# Patient Record
Sex: MALE | Race: WHITE | NOT HISPANIC OR LATINO | Employment: FULL TIME | URBAN - METROPOLITAN AREA
[De-identification: names, ages, dates, MRNs, and addresses within clinical notes are randomized per-mention and may not be internally consistent; named-entity substitution may affect disease eponyms.]

---
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
## 2022-11-03 ENCOUNTER — HOSPITAL ENCOUNTER (OUTPATIENT)
Dept: LAB | Facility: MEDICAL CENTER | Age: 22
End: 2022-11-03
Attending: PHYSICIAN ASSISTANT
Payer: COMMERCIAL

## 2022-11-03 LAB
ALBUMIN SERPL BCP-MCNC: 4.8 G/DL (ref 3.2–4.9)
ALBUMIN/GLOB SERPL: 1.7 G/DL
ALP SERPL-CCNC: 111 U/L (ref 30–99)
ALT SERPL-CCNC: 16 U/L (ref 2–50)
ANION GAP SERPL CALC-SCNC: 9 MMOL/L (ref 7–16)
AST SERPL-CCNC: 17 U/L (ref 12–45)
BASOPHILS # BLD AUTO: 0.6 % (ref 0–1.8)
BASOPHILS # BLD: 0.04 K/UL (ref 0–0.12)
BILIRUB SERPL-MCNC: 0.7 MG/DL (ref 0.1–1.5)
BUN SERPL-MCNC: 12 MG/DL (ref 8–22)
CALCIUM SERPL-MCNC: 9.6 MG/DL (ref 8.5–10.5)
CHLORIDE SERPL-SCNC: 103 MMOL/L (ref 96–112)
CO2 SERPL-SCNC: 26 MMOL/L (ref 20–33)
CREAT SERPL-MCNC: 1.05 MG/DL (ref 0.5–1.4)
EOSINOPHIL # BLD AUTO: 0.26 K/UL (ref 0–0.51)
EOSINOPHIL NFR BLD: 3.8 % (ref 0–6.9)
ERYTHROCYTE [DISTWIDTH] IN BLOOD BY AUTOMATED COUNT: 40.6 FL (ref 35.9–50)
GFR SERPLBLD CREATININE-BSD FMLA CKD-EPI: 104 ML/MIN/1.73 M 2
GLOBULIN SER CALC-MCNC: 2.8 G/DL (ref 1.9–3.5)
GLUCOSE SERPL-MCNC: 98 MG/DL (ref 65–99)
HCT VFR BLD AUTO: 47.4 % (ref 42–52)
HGB BLD-MCNC: 16.4 G/DL (ref 14–18)
IMM GRANULOCYTES # BLD AUTO: 0.02 K/UL (ref 0–0.11)
IMM GRANULOCYTES NFR BLD AUTO: 0.3 % (ref 0–0.9)
LYMPHOCYTES # BLD AUTO: 2.65 K/UL (ref 1–4.8)
LYMPHOCYTES NFR BLD: 39.1 % (ref 22–41)
MCH RBC QN AUTO: 30.4 PG (ref 27–33)
MCHC RBC AUTO-ENTMCNC: 34.6 G/DL (ref 33.7–35.3)
MCV RBC AUTO: 87.9 FL (ref 81.4–97.8)
MONOCYTES # BLD AUTO: 0.57 K/UL (ref 0–0.85)
MONOCYTES NFR BLD AUTO: 8.4 % (ref 0–13.4)
NEUTROPHILS # BLD AUTO: 3.24 K/UL (ref 1.82–7.42)
NEUTROPHILS NFR BLD: 47.8 % (ref 44–72)
NRBC # BLD AUTO: 0 K/UL
NRBC BLD-RTO: 0 /100 WBC
PLATELET # BLD AUTO: 266 K/UL (ref 164–446)
PMV BLD AUTO: 10.1 FL (ref 9–12.9)
POTASSIUM SERPL-SCNC: 4.6 MMOL/L (ref 3.6–5.5)
PROT SERPL-MCNC: 7.6 G/DL (ref 6–8.2)
RBC # BLD AUTO: 5.39 M/UL (ref 4.7–6.1)
SODIUM SERPL-SCNC: 138 MMOL/L (ref 135–145)
WBC # BLD AUTO: 6.8 K/UL (ref 4.8–10.8)

## 2022-11-03 PROCEDURE — 80053 COMPREHEN METABOLIC PANEL: CPT

## 2022-11-03 PROCEDURE — 36415 COLL VENOUS BLD VENIPUNCTURE: CPT

## 2022-11-03 PROCEDURE — 85025 COMPLETE CBC W/AUTO DIFF WBC: CPT

## 2022-11-11 ENCOUNTER — HOSPITAL ENCOUNTER (OUTPATIENT)
Dept: RADIOLOGY | Facility: MEDICAL CENTER | Age: 22
End: 2022-11-11
Attending: PHYSICIAN ASSISTANT
Payer: COMMERCIAL

## 2022-11-11 DIAGNOSIS — I86.1 SCROTAL VARICES: ICD-10-CM

## 2022-11-11 PROCEDURE — 700117 HCHG RX CONTRAST REV CODE 255: Performed by: PHYSICIAN ASSISTANT

## 2022-11-11 PROCEDURE — 72193 CT PELVIS W/DYE: CPT

## 2022-11-11 RX ADMIN — IOHEXOL 100 ML: 350 INJECTION, SOLUTION INTRAVENOUS at 10:58

## 2022-11-18 NOTE — PROGRESS NOTES
Interventional Radiology Consultation      Re: René Jameson     MRN: [id number]   : 2000    René Jameson was referred by Bruce Greene PA-C. He is a 22 y.o. male seen in clinic for evaluation and possible intervention of a painful varicocele. He does not have a local PCP.    History of Present Illness:   presents with a painful left varicocele for a couple of years. He reports a lump in his left testicle that has not changed in size. He had a scrotal USD in [place] in , and results are not available but the provided clinical notes indicate it was normal. Unfortunately his insurance did not approve the imaging to complete the workup and there is incomplete imaging of the gonadal.  has been referred to interventional radiology for evaluation and management with coil embolization. Today, he reports pain all day from the left testicle radiating to his low back at times. It is both sharp and aching. It does not change with working out but slouching posture can make it worse. He has not had surgery for the varicocele. He lives in [place] and works with no heavy lifting duties. He is unaccompanied by his support person to today's consultation.     No past medical history on file.  No past surgical history on file.  Social History     Socioeconomic History    Marital status: Single     Spouse name: Not on file    Number of children: Not on file    Years of education: Not on file    Highest education level: Not on file   Occupational History    Not on file   Tobacco Use    Smoking status: Never    Smokeless tobacco: Never   Substance and Sexual Activity    Alcohol use: Not on file    Drug use: Not on file    Sexual activity: Not on file   Other Topics Concern    Not on file   Social History Narrative    Not on file     Social Determinants of Health     Financial Resource Strain: Not on file   Food Insecurity: Not on file   Transportation Needs: Not on file   Physical Activity: Not on file    Stress: Not on file   Social Connections: Not on file   Intimate Partner Violence: Not on file   Housing Stability: Not on file     No family history on file.    Review of Systems   Constitutional: Negative.  Negative for chills, diaphoresis, fever, malaise/fatigue and weight loss.   Genitourinary:         Positive for left testicle pain   Musculoskeletal:  Positive for back pain.   Psychiatric/Behavioral:  Negative for substance abuse.      A comprehensive 14-point review of systems was negative except as described above.     Labs:   None available for review      Pathology:  none    Radiology:   CT pelvis on 11/11/22 at [facility]:  Unremarkable CT pelvis.    USD scrotum 6/11/21, report only for [place]:      Current Outpatient Medications   Medication Sig Dispense Refill    benzonatate (TESSALON) 100 MG Cap Take 1-2 caps PO TID prn cough 40 Capsule 0     No current facility-administered medications for this visit.       Allergies   Allergen Reactions    Biaxin [Clarithromycin]        Physical Exam  Constitutional:       General: He is not in acute distress.     Appearance: He is not diaphoretic.   HENT:      Head: Normocephalic.   Eyes:      General: No scleral icterus.  Pulmonary:      Effort: Pulmonary effort is normal. No respiratory distress.   Abdominal:      General: There is no distension.   Skin:     General: Skin is warm and dry.      Coloration: Skin is not pale.      Findings: No erythema or rash.   Neurological:      General: No focal deficit present.      Mental Status: He is alert and oriented to person, place, and time. He is not disoriented.      Cranial Nerves: No cranial nerve deficit or facial asymmetry.      Sensory: Sensation is intact.      Motor: No weakness or tremor.      Coordination: Coordination normal.      Gait: Gait is intact. Gait normal.   Psychiatric:         Mood and Affect: Mood and affect normal.         Behavior: Behavior normal.         Thought Content: Thought content  normal.         Cognition and Memory: Memory normal.         Judgment: Judgment normal.     Impression:   1. Painful left varicocele.    Plan:   Marcus Rivera MD has reviewed 's history and imaging studies, examined the patient, and discussed treatment options.  has a reported painful left varicocele. We have no diagnostic criteria so we have ordered an ultrasound to evaluate the complaint. If he meets diagnostic criteria, he would be a candidate for embolization. We discussed the method of the procedure at length including angiography and embolization as well as the expected clinical course with the possibility of post embolization discomfort and lifting restrictions of 3 weeks. We discussed the possibility of varicocele recurrence. We will discuss the procedure risks if he meets diagnostic criteria and wishes to schedule the procedure. We will contact the patient once the ultrasound results are available.    JODY Mcfarland with Marcus Rivera MD  Interventional Radiology   [address] ([address])  [place] [address]  [phone]

## 2022-11-22 ENCOUNTER — HOSPITAL ENCOUNTER (OUTPATIENT)
Dept: RADIOLOGY | Facility: MEDICAL CENTER | Age: 22
End: 2022-11-22
Attending: PHYSICIAN ASSISTANT
Payer: COMMERCIAL

## 2022-11-22 DIAGNOSIS — N50.819 PAIN IN TESTICLE, UNSPECIFIED LATERALITY: ICD-10-CM

## 2022-11-22 DIAGNOSIS — I86.1 VARICOCELE: ICD-10-CM

## 2022-11-22 ASSESSMENT — LIFESTYLE VARIABLES: SUBSTANCE_ABUSE: 0

## 2022-11-22 ASSESSMENT — ENCOUNTER SYMPTOMS
CONSTITUTIONAL NEGATIVE: 1
FEVER: 0
WEIGHT LOSS: 0
DIAPHORESIS: 0
BACK PAIN: 1
CHILLS: 0

## 2023-07-08 ENCOUNTER — OFFICE VISIT (OUTPATIENT)
Dept: URGENT CARE | Facility: CLINIC | Age: 23
End: 2023-07-08
Payer: COMMERCIAL

## 2023-07-08 VITALS
OXYGEN SATURATION: 96 % | HEART RATE: 100 BPM | WEIGHT: 164.6 LBS | TEMPERATURE: 97.7 F | SYSTOLIC BLOOD PRESSURE: 110 MMHG | BODY MASS INDEX: 20.47 KG/M2 | RESPIRATION RATE: 16 BRPM | DIASTOLIC BLOOD PRESSURE: 62 MMHG | HEIGHT: 75 IN

## 2023-07-08 DIAGNOSIS — H16.9: ICD-10-CM

## 2023-07-08 DIAGNOSIS — H18.822 CONTACT LENS OVERWEAR OF LEFT EYE: ICD-10-CM

## 2023-07-08 PROCEDURE — 3074F SYST BP LT 130 MM HG: CPT

## 2023-07-08 PROCEDURE — 99213 OFFICE O/P EST LOW 20 MIN: CPT

## 2023-07-08 PROCEDURE — 3078F DIAST BP <80 MM HG: CPT

## 2023-07-08 RX ORDER — OFLOXACIN 3 MG/ML
1 SOLUTION/ DROPS OPHTHALMIC 4 TIMES DAILY
Qty: 5 ML | Refills: 0 | Status: SHIPPED | OUTPATIENT
Start: 2023-07-08 | End: 2023-07-13

## 2023-07-08 RX ORDER — OMEGA-3 FATTY ACIDS/FISH OIL 300-1000MG
CAPSULE ORAL
COMMUNITY
End: 2023-07-08

## 2023-07-08 ASSESSMENT — ENCOUNTER SYMPTOMS
EYE REDNESS: 1
CHILLS: 0
DOUBLE VISION: 0
SHORTNESS OF BREATH: 0
EYE PAIN: 0
FEVER: 0
PHOTOPHOBIA: 1
BLURRED VISION: 0
EYE DISCHARGE: 1

## 2023-07-08 ASSESSMENT — FIBROSIS 4 INDEX: FIB4 SCORE: 0.32

## 2023-07-08 NOTE — PROGRESS NOTES
Subjective:     CHIEF COMPLAINT  Chief Complaint   Patient presents with    Eye Injury     Xtoday, left eye scratch, redness, swelling, slept with contacts in and injured trying to take it out       HPI  René Jameson is a very pleasant 22 y.o. male who presents left eye redness and irritation after accidentally slipping with his contact lens in overnight.  He reports that he only wore his contact for 1 night and that it was only in his left eye.  He does not have any changes in vision or pain with eye movement, but reports his eye feels irritated and has been watering and experiencing light sensitivity.  He otherwise feels well.    REVIEW OF SYSTEMS  Review of Systems   Constitutional:  Negative for chills, fever and malaise/fatigue.   Eyes:  Positive for photophobia (L eye), discharge (L eye watery discharge) and redness (L eye). Negative for blurred vision, double vision and pain.   Respiratory:  Negative for shortness of breath.    Cardiovascular:  Negative for chest pain.       PAST MEDICAL HISTORY  There are no problems to display for this patient.      SURGICAL HISTORY  patient denies any surgical history    ALLERGIES  Allergies   Allergen Reactions    Biaxin [Clarithromycin]     Lac Bovis        CURRENT MEDICATIONS  Home Medications       Reviewed by Jaja Justice P.A.-C. (Physician Assistant) on 07/08/23 at 1431  Med List Status: <None>     Medication Last Dose Status        Patient [place] Taking any Medications                           SOCIAL HISTORY  Social History     Tobacco Use    Smoking status: Never    Smokeless tobacco: Never   Vaping Use    Vaping Use: Never used   Substance and Sexual Activity    Alcohol use: Not on file    Drug use: Not on file    Sexual activity: Not on file       FAMILY HISTORY  History reviewed. No pertinent family history.       Objective:     VITAL SIGNS: /62 (BP Location: Left arm, Patient Position: Sitting, BP Cuff Size: Adult)   Pulse 100   Temp 36.5  "°C (97.7 °F) (Temporal)   Resp 16   Ht 1.9 m (6' 2.8\")   Wt 74.7 kg (164 lb 9.6 oz)   SpO2 96%   BMI 20.68 kg/m²     PHYSICAL EXAM  Physical Exam  Vitals reviewed.   Constitutional:       General: He is not in acute distress.     Appearance: He is normal weight. He is not ill-appearing or toxic-appearing.   HENT:      Head: Normocephalic and atraumatic.      Nose: Nose normal.      Mouth/Throat:      Mouth: Mucous membranes are moist.   Eyes:      Extraocular Movements: Extraocular movements intact.      Pupils: Pupils are equal, round, and reactive to light.      Comments: Left conjunctiva is injected with watery discharge.  Photophobia present during examination.  Eyelids are unremarkable.    Left eye assessed under Woods lamp after using fluorescein stain and proparacaine eyedrop.  No corneal abrasion/ulceration observed.  No evidence of foreign body.   Cardiovascular:      Rate and Rhythm: Normal rate.   Pulmonary:      Effort: Pulmonary effort is normal. No respiratory distress.   Skin:     General: Skin is warm and dry.   Neurological:      General: No focal deficit present.      Mental Status: He is alert and oriented to person, place, and time.   Psychiatric:         Mood and Affect: Mood normal.         Assessment/Plan:     1. Corneal inflammation, left  - ofloxacin (OCUFLOX) 0.3 % Solution; Administer 1 Drop into the left eye 4 times a day for 5 days.  Dispense: 5 mL; Refill: 0    2. Contact lens overwear of left eye  -Return to clinic if any acute changes occur such as changes in vision      MDM/Comments:  Patient has stable vital signs and is non-toxic appearing.  Ofloxacin prescribed for potential secondary infection from contact lens overuse.  Patient's vision is intact and there is no evidence of corneal pathology upon fluorescein stain investigation.  Strict return precautions discussed with patient including any changes in vision, increased pain, worsening of symptoms.  Patient demonstrated " understanding of treatment plan at this time and will RTC if symptoms worsen or fail to resolve.     Differential diagnosis, natural history, supportive care, and indications for immediate follow-up discussed. All questions answered. Patient agrees with the plan of care.    Follow-up as needed if symptoms worsen or fail to improve to PCP, Urgent care or Emergency Room.    I have personally reviewed prior external notes and test results pertinent to today's visit.  I have independently reviewed and interpreted all diagnostics ordered during this urgent care acute visit.   Discussed management options (risks,benefits, and alternatives to treatment). Pt expresses understanding and the treatment plan was agreed upon. Questions were encouraged and answered to pt's satisfaction.    Please note that this dictation was created using voice recognition software. I have made a reasonable attempt to correct obvious errors, but I expect that there are errors of grammar and possibly content that I did not discover before finalizing the note.

## 2023-10-24 DIAGNOSIS — I86.1 VARICOCELE: ICD-10-CM

## 2023-10-24 NOTE — PROGRESS NOTES
Updated referral to IR for varicocele embolization. Last evaluated 2022, plan was for scrotal USD prior to intervention to confirm presence of varicocele. Order has since  and pt wishes to move forward. Scan reordered.

## 2023-11-20 ENCOUNTER — HOSPITAL ENCOUNTER (OUTPATIENT)
Dept: RADIOLOGY | Facility: MEDICAL CENTER | Age: 23
End: 2023-11-20
Attending: NURSE PRACTITIONER
Payer: COMMERCIAL

## 2023-11-20 DIAGNOSIS — I86.1 VARICOCELE: ICD-10-CM

## 2023-11-20 PROCEDURE — 76870 US EXAM SCROTUM: CPT

## 2023-11-30 ENCOUNTER — HOSPITAL ENCOUNTER (OUTPATIENT)
Dept: RADIOLOGY | Facility: MEDICAL CENTER | Age: 23
End: 2023-11-30
Attending: PHYSICIAN ASSISTANT
Payer: COMMERCIAL

## 2023-11-30 DIAGNOSIS — I86.1 SCROTAL VARICES: ICD-10-CM

## 2023-11-30 NOTE — CONSULTS
Telephone Appointment Visit   This telephone visit was initiated by the patient and they verbally consented. Marcus Rivera MD present and participated in entirety of call.     Reason for Call:   imaging follow up    HPI:    René Jameson was referred by Bruce Greene PA-C. He is a 23 y.o. male seen in clinic for evaluation and possible intervention of a painful varicocele. He does not have a local PCP.    History of Present Illness:  Initial consultation 11/22/22:  presents with a painful left varicocele for a couple of years. He reports a lump in his left testicle that has not changed in size. He had a scrotal USD in [place] in 2021, and results are not available but the provided clinical notes indicate it was normal. Unfortunately his insurance did not approve the imaging to complete the workup and there is incomplete imaging of the gonadal.  has been referred to interventional radiology for evaluation and management with coil embolization. Today, he reports pain all day from the left testicle radiating to his low back at times. It is both sharp and aching. It does not change with working out but slouching posture can make it worse. He has not had surgery for the varicocele. He lives in [place] and works with no heavy lifting duties. He is unaccompanied by his support person to today's consultation.     Interval history 11/30/23: Mr. Pérez underwent a diagnostic ultrasound with valsalva on 11/20/23, which was negative for varicocele. He is contacted today to discuss the findings. Today, he reports he can still feel a mass on the left testicle and that he does not think it is normal. He has questions about why he can palpate a mass on the side of his testicle and why it is not present on ultrasound and CT. It is quite painful and makes it difficult to maintain an erection due to the pain. He expresses frustration with the lack of intervention to address his pain to date. He has not been back to see  the referring urologist.     No past medical history on file.  No past surgical history on file.  Social History     Socioeconomic History    Marital status: Single     Spouse name: Not on file    Number of children: Not on file    Years of education: Not on file    Highest education level: Not on file   Occupational History    Not on file   Tobacco Use    Smoking status: Never    Smokeless tobacco: Never   Vaping Use    Vaping Use: Never used   Substance and Sexual Activity    Alcohol use: Not on file    Drug use: Not on file    Sexual activity: Not on file   Other Topics Concern    Not on file   Social History Narrative    Not on file     Social Determinants of Health     Financial Resource Strain: Not on file   Food Insecurity: Not on file   Transportation Needs: Not on file   Physical Activity: Not on file   Stress: Not on file   Social Connections: Not on file   Intimate Partner Violence: Not on file   Housing Stability: Not on file     No family history on file.    Review of Systems   Genitourinary:         Positive for left testicle pain       Labs / Images Reviewed:   Labs:   None available for review    Pathology:  none    Radiology:   USD scrotum 11/20/23 at [facility]:  1.  No testicular mass identified.  2.  No varicocele identified.    CT pelvis on 11/11/22 at [facility]:  Unremarkable CT pelvis.    USD scrotum 6/11/21, report only for [facility]:      No current outpatient medications on file.     No current facility-administered medications for this encounter.       Allergies   Allergen Reactions    Biaxin [Clarithromycin]     Lac Bovis        Physical Exam  Neurological:      Mental Status: He is alert and oriented to person, place, and time.   Psychiatric:         Behavior: Behavior normal.         Thought Content: Thought content normal.         Cognition and Memory: Memory normal.         Judgment: Judgment normal.       Assessment and Plan:   Impression:   1. Painful left testicle.    Plan:   Marcus  MD Miguel has reviewed 's history and imaging studies and discussed treatment options.  has a painful left testicle, and ultrasound of the testicle does not meet diagnostic criteria for varicocele, therefore he is not a candidate for varicocele embolization. We explained that the source of his persistent symptoms is not a varicocele by diagnostic criteria and the intervention is not medically indicated. In addition, intervention would not address his symptoms, which are likely related to another etiology that is outside the scope of our expertise. He expressed frustration with his persistent symptoms and negative imaging workup and we advised him to return to his urologist for further evaluation and management.    Follow-up: with urologist    Total Call Time Spent (mins): 6    JODY Mcfarland with Marcus Rivera MD  Interventional Radiology   [address] ([address])  [place] [address]  [phone]

## 2024-01-31 ENCOUNTER — HOSPITAL ENCOUNTER (OUTPATIENT)
Facility: MEDICAL CENTER | Age: 24
End: 2024-01-31
Attending: FAMILY MEDICINE
Payer: COMMERCIAL

## 2024-01-31 ENCOUNTER — OFFICE VISIT (OUTPATIENT)
Dept: MEDICAL GROUP | Facility: MEDICAL CENTER | Age: 24
End: 2024-01-31
Payer: COMMERCIAL

## 2024-01-31 VITALS
DIASTOLIC BLOOD PRESSURE: 70 MMHG | RESPIRATION RATE: 16 BRPM | HEART RATE: 60 BPM | OXYGEN SATURATION: 99 % | TEMPERATURE: 98.4 F | BODY MASS INDEX: 21.05 KG/M2 | HEIGHT: 74 IN | WEIGHT: 164 LBS | SYSTOLIC BLOOD PRESSURE: 110 MMHG

## 2024-01-31 DIAGNOSIS — N50.812 PAIN IN LEFT TESTICLE: ICD-10-CM

## 2024-01-31 DIAGNOSIS — Z23 NEED FOR VACCINATION: ICD-10-CM

## 2024-01-31 DIAGNOSIS — H53.9 VISION CHANGES: ICD-10-CM

## 2024-01-31 DIAGNOSIS — Z11.59 NEED FOR HEPATITIS C SCREENING TEST: ICD-10-CM

## 2024-01-31 DIAGNOSIS — Z11.3 SCREENING EXAMINATION FOR SEXUALLY TRANSMITTED DISEASE: ICD-10-CM

## 2024-01-31 DIAGNOSIS — Z77.018 EXPOSURE TO MERCURY: ICD-10-CM

## 2024-01-31 DIAGNOSIS — Z13.6 SCREENING FOR CARDIOVASCULAR CONDITION: ICD-10-CM

## 2024-01-31 PROBLEM — N50.819 PAIN IN TESTICLE: Status: ACTIVE | Noted: 2022-10-31

## 2024-01-31 PROBLEM — I86.1 VARICOCELE: Status: ACTIVE | Noted: 2022-10-31

## 2024-01-31 LAB
AMORPH CRY #/AREA URNS HPF: PRESENT /HPF
APPEARANCE UR: ABNORMAL
BACTERIA #/AREA URNS HPF: NEGATIVE /HPF
BILIRUB UR QL STRIP.AUTO: NEGATIVE
COLOR UR: YELLOW
EPI CELLS #/AREA URNS HPF: NEGATIVE /HPF
GLUCOSE UR STRIP.AUTO-MCNC: NEGATIVE MG/DL
HYALINE CASTS #/AREA URNS LPF: ABNORMAL /LPF
KETONES UR STRIP.AUTO-MCNC: ABNORMAL MG/DL
LEUKOCYTE ESTERASE UR QL STRIP.AUTO: NEGATIVE
MICRO URNS: ABNORMAL
NITRITE UR QL STRIP.AUTO: NEGATIVE
PH UR STRIP.AUTO: 5 [PH] (ref 5–8)
PROT UR QL STRIP: NEGATIVE MG/DL
RBC # URNS HPF: ABNORMAL /HPF
RBC UR QL AUTO: NEGATIVE
SP GR UR STRIP.AUTO: 1.02
UROBILINOGEN UR STRIP.AUTO-MCNC: 0.2 MG/DL
WBC #/AREA URNS HPF: ABNORMAL /HPF

## 2024-01-31 PROCEDURE — 3074F SYST BP LT 130 MM HG: CPT | Performed by: FAMILY MEDICINE

## 2024-01-31 PROCEDURE — 90715 TDAP VACCINE 7 YRS/> IM: CPT | Performed by: FAMILY MEDICINE

## 2024-01-31 PROCEDURE — 99214 OFFICE O/P EST MOD 30 MIN: CPT | Mod: 25 | Performed by: FAMILY MEDICINE

## 2024-01-31 PROCEDURE — 90471 IMMUNIZATION ADMIN: CPT | Performed by: FAMILY MEDICINE

## 2024-01-31 PROCEDURE — 81001 URINALYSIS AUTO W/SCOPE: CPT

## 2024-01-31 PROCEDURE — 3078F DIAST BP <80 MM HG: CPT | Performed by: FAMILY MEDICINE

## 2024-01-31 SDOH — ECONOMIC STABILITY: TRANSPORTATION INSECURITY
IN THE PAST 12 MONTHS, HAS THE LACK OF TRANSPORTATION KEPT YOU FROM MEDICAL APPOINTMENTS OR FROM GETTING MEDICATIONS?: NO

## 2024-01-31 SDOH — HEALTH STABILITY: MENTAL HEALTH
STRESS IS WHEN SOMEONE FEELS TENSE, NERVOUS, ANXIOUS, OR CAN'T SLEEP AT NIGHT BECAUSE THEIR MIND IS TROUBLED. HOW STRESSED ARE YOU?: RATHER MUCH

## 2024-01-31 SDOH — ECONOMIC STABILITY: INCOME INSECURITY: HOW HARD IS IT FOR YOU TO PAY FOR THE VERY BASICS LIKE FOOD, HOUSING, MEDICAL CARE, AND HEATING?: NOT VERY HARD

## 2024-01-31 SDOH — ECONOMIC STABILITY: INCOME INSECURITY: IN THE LAST 12 MONTHS, WAS THERE A TIME WHEN YOU WERE NOT ABLE TO PAY THE MORTGAGE OR RENT ON TIME?: NO

## 2024-01-31 SDOH — ECONOMIC STABILITY: FOOD INSECURITY: WITHIN THE PAST 12 MONTHS, THE FOOD YOU BOUGHT JUST DIDN'T LAST AND YOU DIDN'T HAVE MONEY TO GET MORE.: SOMETIMES TRUE

## 2024-01-31 SDOH — HEALTH STABILITY: PHYSICAL HEALTH: ON AVERAGE, HOW MANY MINUTES DO YOU ENGAGE IN EXERCISE AT THIS LEVEL?: 150+ MIN

## 2024-01-31 SDOH — ECONOMIC STABILITY: HOUSING INSECURITY
IN THE LAST 12 MONTHS, WAS THERE A TIME WHEN YOU DID NOT HAVE A STEADY PLACE TO SLEEP OR SLEPT IN A SHELTER (INCLUDING NOW)?: NO

## 2024-01-31 SDOH — ECONOMIC STABILITY: FOOD INSECURITY: WITHIN THE PAST 12 MONTHS, YOU WORRIED THAT YOUR FOOD WOULD RUN OUT BEFORE YOU GOT MONEY TO BUY MORE.: SOMETIMES TRUE

## 2024-01-31 SDOH — HEALTH STABILITY: PHYSICAL HEALTH: ON AVERAGE, HOW MANY DAYS PER WEEK DO YOU ENGAGE IN MODERATE TO STRENUOUS EXERCISE (LIKE A BRISK WALK)?: 4 DAYS

## 2024-01-31 SDOH — ECONOMIC STABILITY: HOUSING INSECURITY: IN THE LAST 12 MONTHS, HOW MANY PLACES HAVE YOU LIVED?: 2

## 2024-01-31 SDOH — ECONOMIC STABILITY: TRANSPORTATION INSECURITY
IN THE PAST 12 MONTHS, HAS LACK OF RELIABLE TRANSPORTATION KEPT YOU FROM MEDICAL APPOINTMENTS, MEETINGS, WORK OR FROM GETTING THINGS NEEDED FOR DAILY LIVING?: NO

## 2024-01-31 SDOH — ECONOMIC STABILITY: TRANSPORTATION INSECURITY
IN THE PAST 12 MONTHS, HAS LACK OF TRANSPORTATION KEPT YOU FROM MEETINGS, WORK, OR FROM GETTING THINGS NEEDED FOR DAILY LIVING?: NO

## 2024-01-31 ASSESSMENT — SOCIAL DETERMINANTS OF HEALTH (SDOH)
HOW OFTEN DO YOU ATTENT MEETINGS OF THE CLUB OR ORGANIZATION YOU BELONG TO?: PATIENT DECLINED
HOW MANY DRINKS CONTAINING ALCOHOL DO YOU HAVE ON A TYPICAL DAY WHEN YOU ARE DRINKING: 3 OR 4
ARE YOU MARRIED, WIDOWED, DIVORCED, SEPARATED, NEVER MARRIED, OR LIVING WITH A PARTNER?: NEVER MARRIED
HOW OFTEN DO YOU GET TOGETHER WITH FRIENDS OR RELATIVES?: THREE TIMES A WEEK
HOW OFTEN DO YOU GET TOGETHER WITH FRIENDS OR RELATIVES?: THREE TIMES A WEEK
IN A TYPICAL WEEK, HOW MANY TIMES DO YOU TALK ON THE PHONE WITH FAMILY, FRIENDS, OR NEIGHBORS?: MORE THAN THREE TIMES A WEEK
IN A TYPICAL WEEK, HOW MANY TIMES DO YOU TALK ON THE PHONE WITH FAMILY, FRIENDS, OR NEIGHBORS?: MORE THAN THREE TIMES A WEEK
HOW HARD IS IT FOR YOU TO PAY FOR THE VERY BASICS LIKE FOOD, HOUSING, MEDICAL CARE, AND HEATING?: NOT VERY HARD
WITHIN THE PAST 12 MONTHS, YOU WORRIED THAT YOUR FOOD WOULD RUN OUT BEFORE YOU GOT THE MONEY TO BUY MORE: SOMETIMES TRUE
DO YOU BELONG TO ANY CLUBS OR ORGANIZATIONS SUCH AS CHURCH GROUPS UNIONS, FRATERNAL OR ATHLETIC GROUPS, OR SCHOOL GROUPS?: YES
HOW OFTEN DO YOU ATTEND CHURCH OR RELIGIOUS SERVICES?: NEVER
HOW OFTEN DO YOU HAVE A DRINK CONTAINING ALCOHOL: 2-4 TIMES A MONTH
HOW OFTEN DO YOU HAVE SIX OR MORE DRINKS ON ONE OCCASION: LESS THAN MONTHLY
HOW OFTEN DO YOU ATTENT MEETINGS OF THE CLUB OR ORGANIZATION YOU BELONG TO?: PATIENT DECLINED
ARE YOU MARRIED, WIDOWED, DIVORCED, SEPARATED, NEVER MARRIED, OR LIVING WITH A PARTNER?: NEVER MARRIED
DO YOU BELONG TO ANY CLUBS OR ORGANIZATIONS SUCH AS CHURCH GROUPS UNIONS, FRATERNAL OR ATHLETIC GROUPS, OR SCHOOL GROUPS?: YES
HOW OFTEN DO YOU ATTEND CHURCH OR RELIGIOUS SERVICES?: NEVER

## 2024-01-31 ASSESSMENT — FIBROSIS 4 INDEX: FIB4 SCORE: 0.34

## 2024-01-31 ASSESSMENT — LIFESTYLE VARIABLES
HOW MANY STANDARD DRINKS CONTAINING ALCOHOL DO YOU HAVE ON A TYPICAL DAY: 3 OR 4
HOW OFTEN DO YOU HAVE A DRINK CONTAINING ALCOHOL: 2-4 TIMES A MONTH
SKIP TO QUESTIONS 9-10: 0
HOW OFTEN DO YOU HAVE SIX OR MORE DRINKS ON ONE OCCASION: LESS THAN MONTHLY
AUDIT-C TOTAL SCORE: 4

## 2024-01-31 ASSESSMENT — PATIENT HEALTH QUESTIONNAIRE - PHQ9: CLINICAL INTERPRETATION OF PHQ2 SCORE: 0

## 2024-01-31 NOTE — PROGRESS NOTES
"Subjective:     CC:  Diagnoses of Pain in left testicle, Exposure to mercury, Vision changes, Screening examination for sexually transmitted disease, Need for hepatitis C screening test, Screening for cardiovascular condition, and Need for vaccination were pertinent to this visit.    HISTORY OF THE PRESENT ILLNESS: Patient is a 23 y.o. male. This pleasant patient is here today to establish care and discuss left testicle pain, concern for mercury poisoning.    Past 1-2 years had an issue with left testicle  Thought it was varicocele but previous imaging did not show anything  Sometimes it can be very painful  The past 4-6 months, tells me his libido is lower, does not have an erection in the morning and feels his voice is higher  No pain with urination or ejaculation  Feels ejaculate has less fluid and less volume  He has tried hot compress, icing and did not help  He does MMA, has taken some hits to the groin  He has seen urology previously but needs a new referral    He has been eating a lot of salmon, 2-3 times per day over several months  He would like to be checked for mercury  He notes he has been having difficulty sleeping, irritability, anxiety, muscle twitching but not sure how new this   Notes floaters in his vision, feels like there might be some blurring in his vision. He does wear contacts      Problem   Pain in Testicle       No current Epic-ordered outpatient medications on file.     No current [facility]-ordered facility-administered medications on file.       Health Maintenance: Completed    ROS:   ROS see HPI      Objective:     Exam: /70   Pulse 60   Temp 36.9 °C (98.4 °F) (Temporal)   Resp 16   Ht 1.88 m (6' 2\")   Wt 74.4 kg (164 lb)   SpO2 99%  Body mass index is 21.06 kg/m².    Physical Exam  Vitals reviewed.   Constitutional:       General: He is not in acute distress.     Appearance: Normal appearance.   HENT:      Head: Normocephalic and atraumatic.   Cardiovascular:      Rate and " Rhythm: Normal rate and regular rhythm.      Heart sounds: Normal heart sounds.   Pulmonary:      Effort: Pulmonary effort is normal. No respiratory distress.      Breath sounds: Normal breath sounds.   Genitourinary:     Penis: Normal.       Testes: Normal.      Comments: Did feel like there was excess tissue on left side but did not feel like a discrete mass and did not provide obvious assymetry visually  Skin:     General: Skin is warm and dry.   Neurological:      Mental Status: He is alert. Mental status is at baseline.      Gait: Gait normal.   Psychiatric:         Mood and Affect: Mood normal.         Behavior: Behavior normal.       11/20/2023 2:32 PM     HISTORY/REASON FOR EXAM: with valsalva, confirm presence of left varicocele.     TECHNIQUE/EXAM DESCRIPTION:  Real-time sonography of the scrotum was performed with gray-scale, color and duplex Doppler imaging.     COMPARISON: None     FINDINGS:     The right testis measures 4.8 x 2.2 x 3.8 cm. Normal in size and echotexture. Normal vascularity on color Doppler. No intratesticular mass.     The left testis measures 4.6 x 2.0 x 2.7 cm. Normal in size and echotexture. Normal vascularity on color Doppler. No intratesticular mass.     Vascular flow is symmetric.     Appearance of the epididymides are within normal limits.     No abnormal volume hydrocele is detected.     No varicocele is detected.     IMPRESSION:     1.  No testicular mass identified.     2.  No varicocele identified.    Assessment & Plan:   23 y.o. male with the following -    1. Pain in left testicle  - Referral to Urology  - CBC WITHOUT DIFFERENTIAL; Future  - Comp Metabolic Panel; Future  - URINALYSIS,CULTURE IF INDICATED; Future  - Chlamydia/GC, PCR (Urine); Future  - T.PALLIDUM AB MIGUEL (SCREENING); Future  - HIV AG/AB COMBO ASSAY SCREENING; Future  - TESTOSTERONE SERUM; Future    2. Exposure to mercury  - MERCURY, BLOOD; Future    3. Vision changes  - Referral to Ophthalmology    4.  Screening examination for sexually transmitted disease  - Chlamydia/GC, PCR (Urine); Future  - T.PALLIDUM AB MIGUEL (SCREENING); Future  - HIV AG/AB COMBO ASSAY SCREENING; Future  - HEP C VIRUS ANTIBODY; Future    5. Need for hepatitis C screening test  - HEP C VIRUS ANTIBODY; Future    6. Screening for cardiovascular condition  - CBC WITHOUT DIFFERENTIAL; Future  - Comp Metabolic Panel; Future  - Lipid Profile; Future    7. Need for vaccination  - Tdap =>8yo IM        Return in about 1 year (around 1/31/2025), or if symptoms worsen or fail to improve, for Annual Visit.    Please note that this dictation was created using voice recognition software. I have made every reasonable attempt to correct obvious errors, but I expect that there are errors of grammar and possibly content that I did not discover before finalizing the note.

## 2024-05-23 ENCOUNTER — TELEPHONE (OUTPATIENT)
Dept: MEDICAL GROUP | Facility: MEDICAL CENTER | Age: 24
End: 2024-05-23

## 2024-05-23 DIAGNOSIS — H53.9 VISION CHANGES: ICD-10-CM

## 2024-05-23 NOTE — TELEPHONE ENCOUNTER
Pt Called and stating that due to having to reschedule his original appointment, [facility] need a new referral before he can make another appointment.